# Patient Record
Sex: FEMALE | Race: OTHER | Employment: UNEMPLOYED | URBAN - METROPOLITAN AREA
[De-identification: names, ages, dates, MRNs, and addresses within clinical notes are randomized per-mention and may not be internally consistent; named-entity substitution may affect disease eponyms.]

---

## 2022-10-03 ENCOUNTER — OFFICE VISIT (OUTPATIENT)
Dept: INTERNAL MEDICINE CLINIC | Age: 22
End: 2022-10-03
Payer: MEDICAID

## 2022-10-03 VITALS
SYSTOLIC BLOOD PRESSURE: 109 MMHG | HEIGHT: 60 IN | RESPIRATION RATE: 18 BRPM | OXYGEN SATURATION: 97 % | DIASTOLIC BLOOD PRESSURE: 77 MMHG | BODY MASS INDEX: 29.45 KG/M2 | WEIGHT: 150 LBS | HEART RATE: 72 BPM

## 2022-10-03 DIAGNOSIS — Z00.00 WELL WOMAN EXAM (NO GYNECOLOGICAL EXAM): Primary | ICD-10-CM

## 2022-10-03 DIAGNOSIS — Z11.59 NEED FOR HEPATITIS C SCREENING TEST: ICD-10-CM

## 2022-10-03 DIAGNOSIS — F90.9 ATTENTION DEFICIT HYPERACTIVITY DISORDER (ADHD), UNSPECIFIED ADHD TYPE: ICD-10-CM

## 2022-10-03 PROCEDURE — 99385 PREV VISIT NEW AGE 18-39: CPT | Performed by: FAMILY MEDICINE

## 2022-10-03 NOTE — PATIENT INSTRUCTIONS
Learning About Attention Deficit Hyperactivity Disorder (ADHD) in Adults  What is ADHD? Attention deficit hyperactivity disorder (ADHD) is a condition in which people have a hard time paying attention. Adults with ADHD also may be more active than normal. They tend to act without thinking. ADHD may make it harder for them to focus, get organized, and finish tasks. ADHD most often starts in childhood and lasts into adulthood. Many adults don't know that they have ADHD until their children are diagnosed. Then they begin to see their own symptoms. Doctors don't know what causes ADHD. But it tends to run in families. What are the symptoms? The most common types of ADHD symptoms in adults are attention problems and hyperactivity. Attention problems  Adults with ADHD often find it hard to:  Finish tasks that don't interest them or aren't easy. But they may become obsessed with activities that they find interesting and enjoy. Keep relationships. Focus their attention on conversations, reading materials, or jobs. They may change jobs a lot. Remember things. They may misplace or lose things. Pay attention. They are easily distracted. They find it hard to focus on one task. Think before they act. They may make quick decisions. They may act before they think about the effect of their actions. Hyperactivity  Adults with ADHD may:  Fidget. They may swing their legs, shift in their seats, or tap their fingers. Move around a lot. They may feel \"revved up\" or on the go. They may not be able to slow down until they are very tired. Find it hard to relax. They may feel restless and find it hard to do quiet things like read or watch TV. How does ADHD affect daily life? ADHD in adults may affect:  Job performance. They may find it hard to organize their work, manage their time, and focus on one task at a time. They may forget, misplace, or lose things. They may quit their jobs out of boredom. Relationships. Adults with ADHD may find it hard to focus their attention on conversations. It is hard for them to \"read\" the behavior and moods of others and express their own feelings. Temper. They may get easily frustrated. This often can make it harder for them to deal with stress. These adults may overreact and have a short, quick temper. The ability to solve problems. Adults who have a hard time waiting for things they want may act before they think about the effect of their actions. They may take part in risky behaviors. These include unprotected sex, unsafe driving, alcohol and drug use, or unwise business ventures. How is ADHD treated? ADHD can be treated with medicines, behavior training, or counseling. Or it may be a combination of these treatments. Medicines  Stimulant medicines are most often used to treat ADHD. These may include:    Amphetamines. (Examples are Adderall and Dexedrine). Methylphenidate. (Examples are Concerta, Daytrana, Focalin, Metadate, and Ritalin). Other medicines that may be used are:    Atomoxetine. This includes Strattera, a nonstimulant medicine for ADHD. Antihypertensives. These include clonidine (such as Catapres) and guanfacine (such as Tenex). Antidepressants. These include bupropion (Wellbutrin). Behavior training  Behavior training can help adults with ADHD learn how to:    Get organized. A daily organizer or planner can help these adults organize their daily tasks. They can write down appointments and other things they need to remember. Decrease distractions. They can set up their work or home environment so that there are fewer things that will distract them. They may find using headphones or a \"white noise\" machine helpful. College students can arrange a quiet living situation. They may need a single dorm room. Work on relationships. Social skills training can help adults with ADHD relate to family, friends, and coworkers.  Couples counseling or family therapy can also help improve relationships. Counseling  Counseling is not meant to treat inattention, hyperactivity, or impulsiveness. But it can help with some of the problems that go along with ADHD. These include not getting along well with others and having problems following rules. Where can you learn more? Go to http://www.gray.com/  Enter Y948 in the search box to learn more about \"Learning About Attention Deficit Hyperactivity Disorder (ADHD) in Adults. \"  Current as of: June 16, 2021               Content Version: 13.2  © 2006-2022 [employer]. Care instructions adapted under license by [employer] (which disclaims liability or warranty for this information). If you have questions about a medical condition or this instruction, always ask your healthcare professional. [address] any warranty or liability for your use of this information.

## 2022-10-03 NOTE — PROGRESS NOTES
No chief complaint on file. she is a 25y.o. year old female who presents for CPE  Complete Physical Exam Questions:    LMP -  10/2/2022  Last Pap (q 3 years, or q5 with HPV) - unknown  Last Mammogram (50-74 biennially)- n/a  Hx of abnl Pap - No    1. Do you follow a low fat diet?  no  2. Are you up to date on your Tdap (<10 years)? Yes  3. Have you ever had a Pneumovax vaccine (>65)? Not applicable   XLS27 Not applicable   OHTT98 Not applicable  4. Have you had Zoster vaccine (>60)? Not applicable  5. Have you had the HPV - Gardasil (13- 26)? Unknown  6. Do you follow an exercise program?  yes  7. Do you smoke?  no  8. Do you consider yourself overweight?  no  9. Is there a family history of CAD< age 48? No  10. Is there a family history of Cancer?  no  11. Do you know your Cancer risks? No  12. Have you had a colonoscopy?  no  13. Have you been tested for HIV or other STI's? No HIV testing today(18-66 y/o)? No  14. Have had a bone density scan or DEXA done(>65)? No  15. Have you had an EKG performed in the last five years (>50)? No    Other complaints:    Reviewed and agree with Nurse Note and duplicated in this note. Reviewed PmHx, RxHx, FmHx, SocHx, AllgHx and updated and dated in the chart. No family history on file.     Past Medical History:   Diagnosis Date    Endocrine disease     early puberty      Social History     Socioeconomic History    Marital status: SINGLE        Review of Systems - negative except as listed above      Objective:     Vitals:    10/03/22 1035   BP: 109/77   Pulse: 72   Resp: 18   SpO2: 97%   Weight: 150 lb (68 kg)   Height: 5' (1.524 m)       Physical Examination: General appearance - alert, well appearing, and in no distress  Eyes - pupils equal and reactive, extraocular eye movements intact  Ears - bilateral TM's and external ear canals normal  Nose - normal and patent, no erythema, discharge or polyps  Mouth - mucous membranes moist, pharynx normal without lesions  Neck - supple, no significant adenopathy  Chest - clear to auscultation, no wheezes, rales or rhonchi, symmetric air entry  Heart - normal rate, regular rhythm, normal S1, S2, no murmurs, rubs, clicks or gallops  Abdomen - soft, nontender, nondistended, no masses or organomegaly  Neurological - alert, oriented, normal speech, no focal findings or movement disorder noted  Musculoskeletal - no joint tenderness, deformity or swelling  Extremities - peripheral pulses normal, no pedal edema, no clubbing or cyanosis  Skin - normal coloration and turgor, no rashes, no suspicious skin lesions noted      Assessment/ Plan:   Diagnoses and all orders for this visit:    1. Well woman exam (no gynecological exam)  -     CBC W/O DIFF; Future  -     LIPID PANEL; Future  -     METABOLIC PANEL, COMPREHENSIVE; Future    2. Need for hepatitis C screening test  -     HEPATITIS C AB; Future    3. Attention deficit hyperactivity disorder (ADHD), unspecified ADHD type  -     10-DRUG SCREEN, URINE W RFLX CONFIRMATION; Future       Patient is currently not in any need of refills, follow-up in 2 months virtually. In meantime she will get her neuropsych profile scanned into system.  is reviewed and urine drug screen will be done today    Labs to be drawn: CBC, CMP, Lipid            I have discussed the diagnosis with the patient and the intended plan as seen in the above orders. The patient has received an after-visit summary and questions were answered concerning future plans. Medication Side Effects and Warnings were discussed with patient,  Patient Labs were reviewed and or requested, and  Patient Past Records were reviewed and or requested  yes         Pt agrees to call or return to clinic and/or go to closest ER with any worsening of symptoms. This may include, but not limited to increased fever (>100.4) with NSAIDS or Tylenol, increased edema, confusion, rash, worsening of presenting symptoms.   Please note that this dictation was completed with [employer], the [employer] voice recognition software. Quite often unanticipated grammatical, syntax, homophones, and other interpretive errors are inadvertently transcribed by the computer software. Please disregard these errors. Please excuse any errors that have escaped final proofreading. Thank you.

## 2022-10-04 LAB
ALBUMIN SERPL-MCNC: 4.6 G/DL (ref 3.9–5)
ALBUMIN/GLOB SERPL: 2.2 {RATIO} (ref 1.2–2.2)
ALP SERPL-CCNC: 61 IU/L (ref 44–121)
ALT SERPL-CCNC: 12 IU/L (ref 0–32)
AST SERPL-CCNC: 10 IU/L (ref 0–40)
BILIRUB SERPL-MCNC: 0.3 MG/DL (ref 0–1.2)
BUN SERPL-MCNC: 10 MG/DL (ref 6–20)
BUN/CREAT SERPL: 14 (ref 9–23)
CALCIUM SERPL-MCNC: 9.6 MG/DL (ref 8.7–10.2)
CHLORIDE SERPL-SCNC: 106 MMOL/L (ref 96–106)
CHOLEST SERPL-MCNC: 163 MG/DL (ref 100–199)
CO2 SERPL-SCNC: 21 MMOL/L (ref 20–29)
CREAT SERPL-MCNC: 0.69 MG/DL (ref 0.57–1)
EGFR: 126 ML/MIN/1.73
ERYTHROCYTE [DISTWIDTH] IN BLOOD BY AUTOMATED COUNT: 13.1 % (ref 11.7–15.4)
GLOBULIN SER CALC-MCNC: 2.1 G/DL (ref 1.5–4.5)
GLUCOSE SERPL-MCNC: 106 MG/DL (ref 70–99)
HCT VFR BLD AUTO: 41.6 % (ref 34–46.6)
HCV AB S/CO SERPL IA: <0.1 S/CO RATIO (ref 0–0.9)
HDLC SERPL-MCNC: 34 MG/DL
HGB BLD-MCNC: 13.6 G/DL (ref 11.1–15.9)
LDLC SERPL CALC-MCNC: 102 MG/DL (ref 0–99)
MCH RBC QN AUTO: 29.4 PG (ref 26.6–33)
MCHC RBC AUTO-ENTMCNC: 32.7 G/DL (ref 31.5–35.7)
MCV RBC AUTO: 90 FL (ref 79–97)
PLATELET # BLD AUTO: 233 X10E3/UL (ref 150–450)
POTASSIUM SERPL-SCNC: 4.3 MMOL/L (ref 3.5–5.2)
PROT SERPL-MCNC: 6.7 G/DL (ref 6–8.5)
RBC # BLD AUTO: 4.62 X10E6/UL (ref 3.77–5.28)
SODIUM SERPL-SCNC: 140 MMOL/L (ref 134–144)
TRIGL SERPL-MCNC: 155 MG/DL (ref 0–149)
VLDLC SERPL CALC-MCNC: 27 MG/DL (ref 5–40)
WBC # BLD AUTO: 5.3 X10E3/UL (ref 3.4–10.8)

## 2022-10-04 NOTE — PROGRESS NOTES
Sugars are slightly elevated as is your cholesterol. Recommend daily exercise 30 minutes a day.   Lets repeat in 6 months

## 2022-10-07 LAB
ALPRAZ UR QL: NEGATIVE
AMPHET UR CFM-MCNC: 2558 NG/ML
AMPHET UR QL CFM: POSITIVE
AMPHETAMINES UR QL SCN: NORMAL NG/ML
AMPHETAMINES UR QL: POSITIVE
BARBITURATES UR QL SCN: NEGATIVE NG/ML
BENZODIAZ UR QL: NEGATIVE NG/ML
BZE UR QL SCN: NEGATIVE NG/ML
CANNABINOIDS UR QL SCN: NEGATIVE NG/ML
CLONAZEPAM UR QL: NEGATIVE
CREAT UR-MCNC: 184.4 MG/DL (ref 20–300)
FLURAZEPAM UR QL: NEGATIVE
LORAZEPAM UR QL: NEGATIVE
METHADONE UR QL SCN: NEGATIVE NG/ML
METHAMPHET UR QL CFM: NEGATIVE
MIDAZOLAM UR QL CFM: NEGATIVE
NORDIAZEPAM UR QL: NEGATIVE
OPIATES UR QL SCN: NEGATIVE NG/ML
OXAZEPAM UR QL: NEGATIVE
OXYCODONE+OXYMORPHONE UR QL SCN: NEGATIVE NG/ML
PCP UR QL: NEGATIVE NG/ML
PH UR: 5.9 [PH] (ref 4.5–8.9)
PLEASE NOTE:, 733157: NORMAL
PROPOXYPH UR QL SCN: NEGATIVE NG/ML
SP GR UR: 1.02
TEMAZEPAM UR QL CFM: NEGATIVE
TRIAZOLAM UR QL: NEGATIVE

## 2023-01-03 ENCOUNTER — OFFICE VISIT (OUTPATIENT)
Dept: INTERNAL MEDICINE CLINIC | Age: 23
End: 2023-01-03
Payer: MEDICAID

## 2023-01-03 VITALS
BODY MASS INDEX: 30.41 KG/M2 | DIASTOLIC BLOOD PRESSURE: 60 MMHG | WEIGHT: 154.9 LBS | TEMPERATURE: 97.9 F | HEART RATE: 62 BPM | HEIGHT: 60 IN | SYSTOLIC BLOOD PRESSURE: 105 MMHG | RESPIRATION RATE: 20 BRPM | OXYGEN SATURATION: 95 %

## 2023-01-03 DIAGNOSIS — F90.9 ATTENTION DEFICIT HYPERACTIVITY DISORDER (ADHD), UNSPECIFIED ADHD TYPE: Primary | ICD-10-CM

## 2023-01-03 DIAGNOSIS — R73.9 ELEVATED BLOOD SUGAR: ICD-10-CM

## 2023-01-03 PROCEDURE — 99214 OFFICE O/P EST MOD 30 MIN: CPT | Performed by: FAMILY MEDICINE

## 2023-01-03 RX ORDER — DEXTROAMPHETAMINE SACCHARATE, AMPHETAMINE ASPARTATE MONOHYDRATE, DEXTROAMPHETAMINE SULFATE AND AMPHETAMINE SULFATE 2.5; 2.5; 2.5; 2.5 MG/1; MG/1; MG/1; MG/1
10 CAPSULE, EXTENDED RELEASE ORAL DAILY
Qty: 30 CAPSULE | Refills: 0 | Status: SHIPPED | OUTPATIENT
Start: 2023-01-03 | End: 2023-02-01

## 2023-01-03 RX ORDER — DEXTROAMPHETAMINE SACCHARATE, AMPHETAMINE ASPARTATE MONOHYDRATE, DEXTROAMPHETAMINE SULFATE AND AMPHETAMINE SULFATE 2.5; 2.5; 2.5; 2.5 MG/1; MG/1; MG/1; MG/1
10 CAPSULE, EXTENDED RELEASE ORAL DAILY
Qty: 30 CAPSULE | Refills: 0 | Status: SHIPPED | OUTPATIENT
Start: 2023-03-04 | End: 2023-04-02

## 2023-01-03 RX ORDER — DEXTROAMPHETAMINE SACCHARATE, AMPHETAMINE ASPARTATE MONOHYDRATE, DEXTROAMPHETAMINE SULFATE AND AMPHETAMINE SULFATE 2.5; 2.5; 2.5; 2.5 MG/1; MG/1; MG/1; MG/1
10 CAPSULE, EXTENDED RELEASE ORAL DAILY
Qty: 30 CAPSULE | Refills: 0 | Status: SHIPPED | OUTPATIENT
Start: 2023-02-02 | End: 2023-03-03

## 2023-01-03 NOTE — PROGRESS NOTES
Chief Complaint   Patient presents with    Follow-up     1. Have you been to the ER, urgent care clinic since your last visit? Hospitalized since your last visit? No    2. Have you seen or consulted any other health care providers outside of the [address] [place] [address] since your last visit? Include any pap smears or colon screening. No  Chief Complaint   Patient presents with    Follow-up     she is a 25y.o. year old female who presents for follow-up of ADD/ADHD   Mental Health Review  Patient is seen for ADHD. Current treatment regimen includes: adderall  Medication compliance: Yes. Pt reports the following side effects: none    Reviewed and agree with Nurse Note and duplicated in this note. Reviewed PmHx, RxHx, FmHx, SocHx, AllgHx and updated and dated in the chart. History reviewed. No pertinent family history.     Past Medical History:   Diagnosis Date    Endocrine disease     early puberty      Social History     Socioeconomic History    Marital status: SINGLE   Tobacco Use    Smoking status: Never   Substance and Sexual Activity    Alcohol use: Never    Drug use: Never        Review of Systems - negative except as listed above      Objective:     Vitals:    01/03/23 0848   Weight: 154 lb 14.4 oz (70.3 kg)   Height: 5' (1.524 m)       Physical Examination: General appearance - alert, well appearing, and in no distress  Eyes - pupils equal and reactive, extraocular eye movements intact  Ears - bilateral TM's and external ear canals normal  Nose - normal and patent, no erythema, discharge or polyps  Mouth - mucous membranes moist, pharynx normal without lesions  Neck - supple, no significant adenopathy  Chest - clear to auscultation, no wheezes, rales or rhonchi, symmetric air entry  Heart - normal rate, regular rhythm, normal S1, S2, no murmurs, rubs, clicks or gallops  Abdomen - soft, nontender, nondistended, no masses or organomegaly  Neurological - alert, oriented, normal speech, no focal findings or movement disorder noted  Musculoskeletal - no joint tenderness, deformity or swelling  Extremities - peripheral pulses normal, no pedal edema, no clubbing or cyanosis  Skin - normal coloration and turgor, no rashes, no suspicious skin lesions noted    Assessment/ Plan:   Diagnoses and all orders for this visit:    1. Attention deficit hyperactivity disorder (ADHD), unspecified ADHD type  -     amphetamine-dextroamphetamine XR (ADDERALL XR) 10 mg XR capsule; Take 1 Capsule by mouth daily for 29 days. Max Daily Amount: 10 mg.  -     amphetamine-dextroamphetamine XR (ADDERALL XR) 10 mg XR capsule; Take 1 Capsule by mouth daily for 29 days. Max Daily Amount: 10 mg.  -     amphetamine-dextroamphetamine XR (ADDERALL XR) 10 mg XR capsule; Take 1 Capsule by mouth daily for 29 days. Max Daily Amount: 10 mg.    2. Elevated blood sugar  -     HEMOGLOBIN A1C WITH EAG; Future         Stable on current medications, refilled for the next 3 months and follow-up in 3 months      I have discussed the diagnosis with the patient and the intended plan as seen in the above orders. The patient has received an after-visit summary and questions were answered concerning future plans. Medication Side Effects and Warnings were discussed with patient,  Patient Labs were reviewed and or requested, and  Patient Past Records were reviewed and or requested  yes         Pt agrees to call or return to clinic and/or go to closest ER with any worsening of symptoms. This may include, but not limited to increased fever (>100.4) with NSAIDS or Tylenol, increased edema, confusion, rash, worsening of presenting symptoms. Please note that this dictation was completed with [employer], the computer voice recognition software. Quite often unanticipated grammatical, syntax, homophones, and other interpretive errors are inadvertently transcribed by the computer software. Please disregard these errors.   Please excuse any errors that have escaped final proofreading. Thank you.

## 2023-01-04 LAB
EST. AVERAGE GLUCOSE BLD GHB EST-MCNC: 108 MG/DL
HBA1C MFR BLD: 5.4 % (ref 4.8–5.6)

## 2023-02-28 DIAGNOSIS — F90.9 ATTENTION DEFICIT HYPERACTIVITY DISORDER (ADHD), UNSPECIFIED ADHD TYPE: ICD-10-CM

## 2023-02-28 RX ORDER — DEXTROAMPHETAMINE SACCHARATE, AMPHETAMINE ASPARTATE MONOHYDRATE, DEXTROAMPHETAMINE SULFATE AND AMPHETAMINE SULFATE 2.5; 2.5; 2.5; 2.5 MG/1; MG/1; MG/1; MG/1
10 CAPSULE, EXTENDED RELEASE ORAL DAILY
Qty: 30 CAPSULE | Refills: 0 | Status: SHIPPED | OUTPATIENT
Start: 2023-02-28 | End: 2023-03-29

## 2023-04-19 ENCOUNTER — OFFICE VISIT (OUTPATIENT)
Dept: INTERNAL MEDICINE CLINIC | Age: 23
End: 2023-04-19
Payer: MEDICAID

## 2023-04-19 VITALS
HEIGHT: 60 IN | RESPIRATION RATE: 18 BRPM | DIASTOLIC BLOOD PRESSURE: 62 MMHG | BODY MASS INDEX: 30.29 KG/M2 | SYSTOLIC BLOOD PRESSURE: 100 MMHG | TEMPERATURE: 97.7 F | HEART RATE: 82 BPM | WEIGHT: 154.3 LBS | OXYGEN SATURATION: 98 %

## 2023-04-19 DIAGNOSIS — F90.9 ATTENTION DEFICIT HYPERACTIVITY DISORDER (ADHD), UNSPECIFIED ADHD TYPE: Primary | ICD-10-CM

## 2023-04-19 PROCEDURE — 99214 OFFICE O/P EST MOD 30 MIN: CPT | Performed by: FAMILY MEDICINE

## 2023-04-19 RX ORDER — DEXTROAMPHETAMINE SACCHARATE, AMPHETAMINE ASPARTATE MONOHYDRATE, DEXTROAMPHETAMINE SULFATE AND AMPHETAMINE SULFATE 2.5; 2.5; 2.5; 2.5 MG/1; MG/1; MG/1; MG/1
10 CAPSULE, EXTENDED RELEASE ORAL
COMMUNITY
End: 2023-04-19 | Stop reason: SDUPTHER

## 2023-04-19 RX ORDER — DEXTROAMPHETAMINE SACCHARATE, AMPHETAMINE ASPARTATE MONOHYDRATE, DEXTROAMPHETAMINE SULFATE AND AMPHETAMINE SULFATE 2.5; 2.5; 2.5; 2.5 MG/1; MG/1; MG/1; MG/1
10 CAPSULE, EXTENDED RELEASE ORAL
Qty: 30 CAPSULE | Refills: 0 | Status: SHIPPED | OUTPATIENT
Start: 2023-05-19

## 2023-04-19 RX ORDER — DEXTROAMPHETAMINE SACCHARATE, AMPHETAMINE ASPARTATE MONOHYDRATE, DEXTROAMPHETAMINE SULFATE AND AMPHETAMINE SULFATE 2.5; 2.5; 2.5; 2.5 MG/1; MG/1; MG/1; MG/1
10 CAPSULE, EXTENDED RELEASE ORAL
Qty: 30 CAPSULE | Refills: 0 | Status: SHIPPED | OUTPATIENT
Start: 2023-04-19

## 2023-04-19 RX ORDER — DEXTROAMPHETAMINE SACCHARATE, AMPHETAMINE ASPARTATE MONOHYDRATE, DEXTROAMPHETAMINE SULFATE AND AMPHETAMINE SULFATE 2.5; 2.5; 2.5; 2.5 MG/1; MG/1; MG/1; MG/1
10 CAPSULE, EXTENDED RELEASE ORAL
Qty: 30 CAPSULE | Refills: 0 | Status: SHIPPED | OUTPATIENT
Start: 2023-06-18

## 2023-04-19 NOTE — PATIENT INSTRUCTIONS
Learning About Attention Deficit Hyperactivity Disorder (ADHD) in Adults  What is ADHD? Attention deficit hyperactivity disorder (ADHD) is a condition in which people have a hard time paying attention. Adults with ADHD also may be more active than normal. They tend to act without thinking. ADHD may make it harder for them to focus, get organized, and finish tasks. ADHD most often starts in childhood and lasts into adulthood. Many adults don't know that they have ADHD until their children are diagnosed. Then they begin to see their own symptoms. Doctors don't know what causes ADHD. But it tends to run in families. What are the symptoms? The most common types of ADHD symptoms in adults are attention problems and hyperactivity. Attention problems  Adults with ADHD often find it hard to:  Finish tasks that don't interest them or aren't easy. But they may become obsessed with activities that they find interesting and enjoy. Keep relationships. Focus their attention on conversations, reading materials, or jobs. They may change jobs a lot. Remember things. They may misplace or lose things. Pay attention. They are easily distracted. They find it hard to focus on one task. Think before they act. They may make quick decisions. They may act before they think about the effect of their actions. Hyperactivity  Adults with ADHD may:  Fidget. They may swing their legs, shift in their seats, or tap their fingers. Move around a lot. They may feel \"revved up\" or on the go. They may not be able to slow down until they are very tired. Find it hard to relax. They may feel restless and find it hard to do quiet things like read or watch TV. How does ADHD affect daily life? ADHD in adults may affect:  Job performance. They may find it hard to organize their work, manage their time, and focus on one task at a time. They may forget, misplace, or lose things. They may quit their jobs out of boredom. Relationships. Adults with ADHD may find it hard to focus their attention on conversations. It is hard for them to \"read\" the behavior and moods of others and express their own feelings. Temper. They may get easily frustrated. This often can make it harder for them to deal with stress. These adults may overreact and have a short, quick temper. The ability to solve problems. Adults who have a hard time waiting for things they want may act before they think about the effect of their actions. They may take part in risky behaviors. These include unprotected sex, unsafe driving, alcohol and drug use, or unwise business ventures. How is ADHD treated? ADHD can be treated with medicines, behavior training, or counseling. Or it may be a combination of these treatments. Medicines  Stimulant medicines are most often used to treat ADHD. These may include:    Amphetamines. (Examples are Adderall and Dexedrine). Methylphenidate. (Examples are Concerta, Daytrana, Focalin, Metadate, and Ritalin). Other medicines that may be used are:    Atomoxetine. This includes Strattera, a nonstimulant medicine for ADHD. Antihypertensives. These include clonidine (such as Catapres) and guanfacine (such as Tenex). Antidepressants. These include bupropion (Wellbutrin). Behavior training  Behavior training can help adults with ADHD learn how to:    Get organized. A daily organizer or planner can help these adults organize their daily tasks. They can write down appointments and other things they need to remember. Decrease distractions. They can set up their work or home environment so that there are fewer things that will distract them. They may find using headphones or a \"white noise\" machine helpful. College students can arrange a quiet living situation. They may need a single dorm room. Work on relationships. Social skills training can help adults with ADHD relate to family, friends, and coworkers.  Couples counseling or family therapy can also help improve relationships. Counseling  Counseling is not meant to treat inattention, hyperactivity, or impulsiveness. But it can help with some of the problems that go along with ADHD. These include not getting along well with others and having problems following rules. Where can you learn more? Go to http://www.gray.com/  Enter P620 in the search box to learn more about \"Learning About Attention Deficit Hyperactivity Disorder (ADHD) in Adults. \"  Current as of: October 20, 2022               Content Version: 13.6  © 2006-2023 [employer]. Care instructions adapted under license by [employer] (which disclaims liability or warranty for this information). If you have questions about a medical condition or this instruction, always ask your healthcare professional. [address] any warranty or liability for your use of this information.

## 2023-05-24 RX ORDER — DEXTROAMPHETAMINE SACCHARATE, AMPHETAMINE ASPARTATE MONOHYDRATE, DEXTROAMPHETAMINE SULFATE AND AMPHETAMINE SULFATE 2.5; 2.5; 2.5; 2.5 MG/1; MG/1; MG/1; MG/1
10 CAPSULE, EXTENDED RELEASE ORAL
COMMUNITY
Start: 2023-04-19 | End: 2023-05-31 | Stop reason: SDUPTHER

## 2023-05-30 ENCOUNTER — TELEPHONE (OUTPATIENT)
Facility: CLINIC | Age: 23
End: 2023-05-30

## 2023-05-31 DIAGNOSIS — F90.2 ATTENTION DEFICIT HYPERACTIVITY DISORDER (ADHD), COMBINED TYPE: Primary | ICD-10-CM

## 2023-05-31 RX ORDER — DEXTROAMPHETAMINE SACCHARATE, AMPHETAMINE ASPARTATE MONOHYDRATE, DEXTROAMPHETAMINE SULFATE AND AMPHETAMINE SULFATE 2.5; 2.5; 2.5; 2.5 MG/1; MG/1; MG/1; MG/1
10 CAPSULE, EXTENDED RELEASE ORAL EVERY MORNING
Qty: 30 CAPSULE | Refills: 0 | Status: SHIPPED | OUTPATIENT
Start: 2023-05-31 | End: 2023-06-30

## 2023-08-03 ENCOUNTER — TELEMEDICINE (OUTPATIENT)
Facility: CLINIC | Age: 23
End: 2023-08-03
Payer: COMMERCIAL

## 2023-08-03 DIAGNOSIS — F90.9 ATTENTION DEFICIT HYPERACTIVITY DISORDER (ADHD), UNSPECIFIED ADHD TYPE: Primary | ICD-10-CM

## 2023-08-03 PROCEDURE — 99214 OFFICE O/P EST MOD 30 MIN: CPT | Performed by: FAMILY MEDICINE

## 2023-08-03 RX ORDER — DEXTROAMPHETAMINE SACCHARATE, AMPHETAMINE ASPARTATE MONOHYDRATE, DEXTROAMPHETAMINE SULFATE AND AMPHETAMINE SULFATE 2.5; 2.5; 2.5; 2.5 MG/1; MG/1; MG/1; MG/1
10 CAPSULE, EXTENDED RELEASE ORAL DAILY
Qty: 30 CAPSULE | Refills: 0 | Status: SHIPPED | OUTPATIENT
Start: 2023-10-30 | End: 2023-11-29

## 2023-08-03 RX ORDER — DEXTROAMPHETAMINE SACCHARATE, AMPHETAMINE ASPARTATE MONOHYDRATE, DEXTROAMPHETAMINE SULFATE AND AMPHETAMINE SULFATE 2.5; 2.5; 2.5; 2.5 MG/1; MG/1; MG/1; MG/1
10 CAPSULE, EXTENDED RELEASE ORAL DAILY
Qty: 30 CAPSULE | Refills: 0 | Status: SHIPPED | OUTPATIENT
Start: 2023-08-30 | End: 2023-09-29

## 2023-08-03 RX ORDER — DEXTROAMPHETAMINE SACCHARATE, AMPHETAMINE ASPARTATE MONOHYDRATE, DEXTROAMPHETAMINE SULFATE AND AMPHETAMINE SULFATE 2.5; 2.5; 2.5; 2.5 MG/1; MG/1; MG/1; MG/1
10 CAPSULE, EXTENDED RELEASE ORAL DAILY
Qty: 30 CAPSULE | Refills: 0 | Status: SHIPPED | OUTPATIENT
Start: 2023-09-30 | End: 2023-10-30

## 2023-08-03 ASSESSMENT — PATIENT HEALTH QUESTIONNAIRE - PHQ9
SUM OF ALL RESPONSES TO PHQ QUESTIONS 1-9: 0
SUM OF ALL RESPONSES TO PHQ QUESTIONS 1-9: 0
SUM OF ALL RESPONSES TO PHQ9 QUESTIONS 1 & 2: 0
2. FEELING DOWN, DEPRESSED OR HOPELESS: 0
SUM OF ALL RESPONSES TO PHQ QUESTIONS 1-9: 0
1. LITTLE INTEREST OR PLEASURE IN DOING THINGS: 0
SUM OF ALL RESPONSES TO PHQ QUESTIONS 1-9: 0

## 2023-08-03 NOTE — PROGRESS NOTES
Sarah Bearden (:  2000) is a Established patient, presenting virtually for evaluation of the following:    Assessment & Plan   Below is the assessment and plan developed based on review of pertinent history, physical exam, labs, studies, and medications. 1. Attention deficit hyperactivity disorder (ADHD), unspecified ADHD type  -     amphetamine-dextroamphetamine (ADDERALL XR) 10 MG extended release capsule; Take 1 capsule by mouth daily for 30 days. Max Daily Amount: 10 mg, Disp-30 capsule, R-0Normal  -     amphetamine-dextroamphetamine (ADDERALL XR) 10 MG extended release capsule; Take 1 capsule by mouth daily for 30 days. Max Daily Amount: 10 mg, Disp-30 capsule, R-0Normal  -     amphetamine-dextroamphetamine (ADDERALL XR) 10 MG extended release capsule; Take 1 capsule by mouth daily for 30 days. Max Daily Amount: 10 mg, Disp-30 capsule, R-0Normal    Return in 3 months (on 11/3/2023) for ADHD. Subjective   Patient is a 24-year-old female who is following up on ADHD. Patient states that she is taking her medications compliantly and has had no side effects. Denies any chest pain palpitations or insomnia. Patient has had trouble getting a prior Auth done for her Adderall and would like to have this looked into. The Adderall is treating her symptoms well. Review of Systems   All other systems reviewed and are negative.        Objective   Patient-Reported Vitals  No data recorded     Physical Exam  [INSTRUCTIONS:  \"[x]\" Indicates a positive item  \"[]\" Indicates a negative item  -- DELETE ALL ITEMS NOT EXAMINED]    Constitutional: [x] Appears well-developed and well-nourished [x] No apparent distress      [] Abnormal -     Mental status: [x] Alert and awake  [x] Oriented to person/place/time [x] Able to follow commands    [] Abnormal -     Eyes:   EOM    [x]  Normal    [] Abnormal -   Sclera  [x]  Normal    [] Abnormal -          Discharge [x]  None visible   [] Abnormal -     HENT: [x]

## 2023-11-02 ENCOUNTER — TELEMEDICINE (OUTPATIENT)
Facility: CLINIC | Age: 23
End: 2023-11-02
Payer: COMMERCIAL

## 2023-11-02 DIAGNOSIS — F90.9 ATTENTION DEFICIT HYPERACTIVITY DISORDER (ADHD), UNSPECIFIED ADHD TYPE: Primary | ICD-10-CM

## 2023-11-02 PROCEDURE — 99214 OFFICE O/P EST MOD 30 MIN: CPT | Performed by: FAMILY MEDICINE

## 2023-11-02 RX ORDER — DEXTROAMPHETAMINE SACCHARATE, AMPHETAMINE ASPARTATE MONOHYDRATE, DEXTROAMPHETAMINE SULFATE AND AMPHETAMINE SULFATE 2.5; 2.5; 2.5; 2.5 MG/1; MG/1; MG/1; MG/1
10 CAPSULE, EXTENDED RELEASE ORAL DAILY
Qty: 30 CAPSULE | Refills: 0 | Status: SHIPPED | OUTPATIENT
Start: 2024-01-01 | End: 2024-01-31

## 2023-11-02 RX ORDER — DEXTROAMPHETAMINE SACCHARATE, AMPHETAMINE ASPARTATE MONOHYDRATE, DEXTROAMPHETAMINE SULFATE AND AMPHETAMINE SULFATE 2.5; 2.5; 2.5; 2.5 MG/1; MG/1; MG/1; MG/1
10 CAPSULE, EXTENDED RELEASE ORAL DAILY
Qty: 30 CAPSULE | Refills: 0 | Status: SHIPPED | OUTPATIENT
Start: 2023-11-02 | End: 2023-12-02

## 2023-11-02 RX ORDER — DEXTROAMPHETAMINE SACCHARATE, AMPHETAMINE ASPARTATE MONOHYDRATE, DEXTROAMPHETAMINE SULFATE AND AMPHETAMINE SULFATE 2.5; 2.5; 2.5; 2.5 MG/1; MG/1; MG/1; MG/1
10 CAPSULE, EXTENDED RELEASE ORAL DAILY
Qty: 30 CAPSULE | Refills: 0 | Status: SHIPPED | OUTPATIENT
Start: 2023-12-02 | End: 2024-01-01

## 2023-11-02 NOTE — PROGRESS NOTES
Natasha Le, was evaluated through a synchronous (real-time) audio-video encounter. The patient (or guardian if applicable) is aware that this is a billable service, which includes applicable co-pays. This Virtual Visit was conducted with patient's (and/or legal guardian's) consent. Patient identification was verified, and a caregiver was present when appropriate. The patient was located at Home: [address] [place] [address]  Provider was located at Facility (Appt Dept): [facility] [address]      Natasha Le (:  2000) is a Established patient, presenting virtually for evaluation of the following:    Assessment & Plan   Below is the assessment and plan developed based on review of pertinent history, physical exam, labs, studies, and medications. 1. Attention deficit hyperactivity disorder (ADHD), unspecified ADHD type  -     amphetamine-dextroamphetamine (ADDERALL XR) 10 MG extended release capsule; Take 1 capsule by mouth daily for 30 days. Max Daily Amount: 10 mg, Disp-30 capsule, R-0Normal  -     amphetamine-dextroamphetamine (ADDERALL XR) 10 MG extended release capsule; Take 1 capsule by mouth daily for 30 days. Max Daily Amount: 10 mg, Disp-30 capsule, R-0Normal  -     amphetamine-dextroamphetamine (ADDERALL XR) 10 MG extended release capsule; Take 1 capsule by mouth daily for 30 days. Max Daily Amount: 10 mg, Disp-30 capsule, R-0Normal    Return in 3 months (on 2024) for [employer], ADHD. Subjective   Patient is a 58-year-old female following up on ADHD. She states that she is taking her 10 mg Adderall XR and has had no side effects such as chest pain palpitations or insomnia. Patient states that will last her all day and gets her through the workday which is all she needs. She is happy with current treatment      Review of Systems   All other systems reviewed and are negative.          Objective   Patient-Reported Vitals  No data recorded     Physical

## 2024-01-17 DIAGNOSIS — F90.2 ATTENTION DEFICIT HYPERACTIVITY DISORDER (ADHD), COMBINED TYPE: ICD-10-CM

## 2024-01-17 RX ORDER — DEXTROAMPHETAMINE SACCHARATE, AMPHETAMINE ASPARTATE MONOHYDRATE, DEXTROAMPHETAMINE SULFATE AND AMPHETAMINE SULFATE 2.5; 2.5; 2.5; 2.5 MG/1; MG/1; MG/1; MG/1
10 CAPSULE, EXTENDED RELEASE ORAL EVERY MORNING
Qty: 30 CAPSULE | Refills: 0 | Status: SHIPPED | OUTPATIENT
Start: 2024-01-17 | End: 2024-02-16

## 2024-02-02 ENCOUNTER — OFFICE VISIT (OUTPATIENT)
Facility: CLINIC | Age: 24
End: 2024-02-02

## 2024-02-02 VITALS
SYSTOLIC BLOOD PRESSURE: 106 MMHG | TEMPERATURE: 98.3 F | BODY MASS INDEX: 30.43 KG/M2 | OXYGEN SATURATION: 97 % | HEART RATE: 80 BPM | RESPIRATION RATE: 16 BRPM | DIASTOLIC BLOOD PRESSURE: 70 MMHG | WEIGHT: 155 LBS | HEIGHT: 60 IN

## 2024-02-02 DIAGNOSIS — F90.2 ATTENTION DEFICIT HYPERACTIVITY DISORDER (ADHD), COMBINED TYPE: ICD-10-CM

## 2024-02-02 DIAGNOSIS — Z00.00 WELL WOMAN EXAM (NO GYNECOLOGICAL EXAM): Primary | ICD-10-CM

## 2024-02-02 RX ORDER — DEXTROAMPHETAMINE SACCHARATE, AMPHETAMINE ASPARTATE MONOHYDRATE, DEXTROAMPHETAMINE SULFATE AND AMPHETAMINE SULFATE 2.5; 2.5; 2.5; 2.5 MG/1; MG/1; MG/1; MG/1
10 CAPSULE, EXTENDED RELEASE ORAL DAILY
Qty: 30 CAPSULE | Refills: 0 | Status: SHIPPED | OUTPATIENT
Start: 2024-04-02 | End: 2024-05-02

## 2024-02-02 RX ORDER — DEXTROAMPHETAMINE SACCHARATE, AMPHETAMINE ASPARTATE MONOHYDRATE, DEXTROAMPHETAMINE SULFATE AND AMPHETAMINE SULFATE 2.5; 2.5; 2.5; 2.5 MG/1; MG/1; MG/1; MG/1
10 CAPSULE, EXTENDED RELEASE ORAL DAILY
Qty: 30 CAPSULE | Refills: 0 | Status: SHIPPED | OUTPATIENT
Start: 2024-02-02 | End: 2024-03-03

## 2024-02-02 RX ORDER — DEXTROAMPHETAMINE SACCHARATE, AMPHETAMINE ASPARTATE MONOHYDRATE, DEXTROAMPHETAMINE SULFATE AND AMPHETAMINE SULFATE 2.5; 2.5; 2.5; 2.5 MG/1; MG/1; MG/1; MG/1
10 CAPSULE, EXTENDED RELEASE ORAL DAILY
Qty: 30 CAPSULE | Refills: 0 | Status: SHIPPED | OUTPATIENT
Start: 2024-03-03 | End: 2024-04-02

## 2024-02-02 ASSESSMENT — PATIENT HEALTH QUESTIONNAIRE - PHQ9
SUM OF ALL RESPONSES TO PHQ QUESTIONS 1-9: 0
2. FEELING DOWN, DEPRESSED OR HOPELESS: 0
SUM OF ALL RESPONSES TO PHQ QUESTIONS 1-9: 0
1. LITTLE INTEREST OR PLEASURE IN DOING THINGS: 0
SUM OF ALL RESPONSES TO PHQ QUESTIONS 1-9: 0
SUM OF ALL RESPONSES TO PHQ QUESTIONS 1-9: 0
SUM OF ALL RESPONSES TO PHQ9 QUESTIONS 1 & 2: 0

## 2024-02-02 NOTE — PROGRESS NOTES
Chief Complaint   Patient presents with    Annual Exam     Patient is here for a wellness visit.      she is a 23 y.o. year old female who presents for CPE  Complete Physical Exam Questions:    LMP -  1/17/2023  Last Pap (q 3 years, or q5 with HPV) - n/a  Last Mammogram (50-74 biennially)- n/a  Hx of abnl Pap - Not applicable    1.  Do you follow a low fat diet?  no  2.  Are you up to date on your Tdap (<10 years)?  Yes  3.  Have you ever had a Pneumovax vaccine (>65)?  Not applicable   PCV13 Not applicable   PPSV23 Not applicable  4.  Have you had Zoster vaccine (>60)? Not applicable  5.  Have you had the HPV - Gardasil (13- 26)? No  6.  Do you follow an exercise program?  Yes  7.  Do you smoke?  No  8.  Do you consider yourself overweight?  No  9.  Is there a family history of CAD< age 50? No  10.  Is there a family history of Cancer?  No  11.  Do you know your Cancer risks?  Yes  12.  Have you had a colonoscopy?  No  13. Have you been tested for HIV or other STI's? No HIV testing today(18-66 y/o)?No  14.  Have had a bone density scan or DEXA done(>65)?No  15.  Have you had an EKG performed in the last five years (>50)?  No    Other complaints:ADHD     Reviewed and agree with Nurse Note and duplicated in this note.  Reviewed PmHx, RxHx, FmHx, SocHx, AllgHx and updated and dated in the chart.    No family history on file.    Past Medical History:   Diagnosis Date    Endocrine disease     early puberty      Social History     Socioeconomic History    Marital status: Single   Tobacco Use    Smoking status: Never   Substance and Sexual Activity    Alcohol use: Never    Drug use: Never     Social Determinants of Health     Financial Resource Strain: Low Risk  (4/19/2023)    Overall Financial Resource Strain (CARDIA)     Difficulty of Paying Living Expenses: Not very hard   Transportation Needs: No Transportation Needs (4/19/2023)    PRAPARE - Transportation     Lack of Transportation (Medical): No     Lack of

## 2024-02-02 NOTE — PATIENT INSTRUCTIONS
Patient Education        Learning About Attention Deficit Hyperactivity Disorder (ADHD) in Adults  What is ADHD?     Attention deficit hyperactivity disorder (ADHD) is a condition in which people have a hard time paying attention. Adults with ADHD also may be more active than normal. They tend to act without thinking. ADHD may make it harder for them to focus, get organized, and finish tasks.  ADHD most often starts in childhood and lasts into adulthood. Many adults don't know that they have ADHD until their children are diagnosed. Then they begin to see their own symptoms.  Doctors don't know what causes ADHD. But it tends to run in families.  What are the symptoms?  The most common types of ADHD symptoms in adults are attention problems and hyperactivity.  Attention problems  Adults with ADHD often find it hard to:  Finish tasks that don't interest them or aren't easy. But they may become obsessed with activities that they find interesting and enjoy.  Focus their attention on conversations, reading materials, or jobs. They may change jobs a lot.  Remember things. They may misplace or lose things.  Pay attention. They are easily distracted. They find it hard to focus on one task.  Organize tasks and activities. They may not be able to manage their time well. And they may have a hard time keeping their things in order.  Hyperactivity  Adults with ADHD may:  Fidget. They may swing their legs, shift in their seats, or tap their fingers.  Move around a lot. They may feel \"revved up\" or on the go. They may not be able to slow down until they are very tired.  Find it hard to relax. They may feel restless and find it hard to do quiet things like read or watch TV.  Talk a lot or interrupt others. They may blurt out an answer before a question has been completed.  Find it hard to wait their turn, such as when they're in line.  How does ADHD affect daily life?  ADHD in adults may affect:  Job performance. They may find it

## 2024-02-03 LAB
ALBUMIN SERPL-MCNC: 4.8 G/DL (ref 4–5)
ALBUMIN/GLOB SERPL: 2.3 {RATIO} (ref 1.2–2.2)
ALP SERPL-CCNC: 60 IU/L (ref 44–121)
ALT SERPL-CCNC: 17 IU/L (ref 0–32)
AST SERPL-CCNC: 14 IU/L (ref 0–40)
BASOPHILS # BLD AUTO: 0 X10E3/UL (ref 0–0.2)
BASOPHILS NFR BLD AUTO: 0 %
BILIRUB SERPL-MCNC: 0.6 MG/DL (ref 0–1.2)
BUN SERPL-MCNC: 13 MG/DL (ref 6–20)
BUN/CREAT SERPL: 19 (ref 9–23)
CALCIUM SERPL-MCNC: 9.8 MG/DL (ref 8.7–10.2)
CHLORIDE SERPL-SCNC: 105 MMOL/L (ref 96–106)
CHOLEST SERPL-MCNC: 199 MG/DL (ref 100–199)
CO2 SERPL-SCNC: 21 MMOL/L (ref 20–29)
CREAT SERPL-MCNC: 0.69 MG/DL (ref 0.57–1)
EGFRCR SERPLBLD CKD-EPI 2021: 125 ML/MIN/1.73
EOSINOPHIL # BLD AUTO: 0.1 X10E3/UL (ref 0–0.4)
EOSINOPHIL NFR BLD AUTO: 1 %
ERYTHROCYTE [DISTWIDTH] IN BLOOD BY AUTOMATED COUNT: 12.7 % (ref 11.7–15.4)
GLOBULIN SER CALC-MCNC: 2.1 G/DL (ref 1.5–4.5)
GLUCOSE SERPL-MCNC: 98 MG/DL (ref 70–99)
HBA1C MFR BLD: 5.3 % (ref 4.8–5.6)
HCT VFR BLD AUTO: 42.4 % (ref 34–46.6)
HDLC SERPL-MCNC: 43 MG/DL
HGB BLD-MCNC: 13.8 G/DL (ref 11.1–15.9)
HIV 1+2 AB+HIV1 P24 AG SERPL QL IA: NON REACTIVE
IMM GRANULOCYTES # BLD AUTO: 0 X10E3/UL (ref 0–0.1)
IMM GRANULOCYTES NFR BLD AUTO: 0 %
LDLC SERPL CALC-MCNC: 136 MG/DL (ref 0–99)
LYMPHOCYTES # BLD AUTO: 1.9 X10E3/UL (ref 0.7–3.1)
LYMPHOCYTES NFR BLD AUTO: 39 %
MCH RBC QN AUTO: 29.2 PG (ref 26.6–33)
MCHC RBC AUTO-ENTMCNC: 32.5 G/DL (ref 31.5–35.7)
MCV RBC AUTO: 90 FL (ref 79–97)
MONOCYTES # BLD AUTO: 0.2 X10E3/UL (ref 0.1–0.9)
MONOCYTES NFR BLD AUTO: 5 %
NEUTROPHILS # BLD AUTO: 2.7 X10E3/UL (ref 1.4–7)
NEUTROPHILS NFR BLD AUTO: 55 %
PLATELET # BLD AUTO: 242 X10E3/UL (ref 150–450)
POTASSIUM SERPL-SCNC: 4.4 MMOL/L (ref 3.5–5.2)
PROT SERPL-MCNC: 6.9 G/DL (ref 6–8.5)
RBC # BLD AUTO: 4.73 X10E6/UL (ref 3.77–5.28)
SODIUM SERPL-SCNC: 140 MMOL/L (ref 134–144)
TRIGL SERPL-MCNC: 113 MG/DL (ref 0–149)
VLDLC SERPL CALC-MCNC: 20 MG/DL (ref 5–40)
WBC # BLD AUTO: 5 X10E3/UL (ref 3.4–10.8)

## 2024-02-09 LAB
AMPHET UR CFM-MCNC: 1079 NG/ML
AMPHET UR QL CFM: POSITIVE
AMPHETAMINES UR QL SCN: NORMAL NG/ML
AMPHETAMINES UR QL: POSITIVE
BARBITURATES UR QL SCN: NEGATIVE NG/ML
BENZODIAZ UR QL SCN: NEGATIVE NG/ML
BZE UR QL SCN: NEGATIVE NG/ML
CANNABINOIDS UR QL SCN: NEGATIVE NG/ML
CREAT UR-MCNC: 240.2 MG/DL (ref 20–300)
LABORATORY COMMENT REPORT: NORMAL
METHADONE UR QL SCN: NEGATIVE NG/ML
METHAMPHET UR QL CFM: NEGATIVE
OPIATES UR QL SCN: NEGATIVE NG/ML
OXYCODONE+OXYMORPHONE UR QL SCN: NEGATIVE NG/ML
PCP UR QL: NEGATIVE NG/ML
PH UR: 5.7 [PH] (ref 4.5–8.9)
PROPOXYPH UR QL SCN: NEGATIVE NG/ML

## 2024-05-03 ENCOUNTER — TELEMEDICINE (OUTPATIENT)
Facility: CLINIC | Age: 24
End: 2024-05-03
Payer: COMMERCIAL

## 2024-05-03 DIAGNOSIS — F90.2 ATTENTION DEFICIT HYPERACTIVITY DISORDER (ADHD), COMBINED TYPE: Primary | ICD-10-CM

## 2024-05-03 PROCEDURE — 99214 OFFICE O/P EST MOD 30 MIN: CPT | Performed by: FAMILY MEDICINE

## 2024-05-03 RX ORDER — DEXTROAMPHETAMINE SACCHARATE, AMPHETAMINE ASPARTATE MONOHYDRATE, DEXTROAMPHETAMINE SULFATE AND AMPHETAMINE SULFATE 2.5; 2.5; 2.5; 2.5 MG/1; MG/1; MG/1; MG/1
10 CAPSULE, EXTENDED RELEASE ORAL DAILY
Qty: 30 CAPSULE | Refills: 0 | Status: SHIPPED | OUTPATIENT
Start: 2024-05-03 | End: 2024-06-02

## 2024-05-03 RX ORDER — DEXTROAMPHETAMINE SACCHARATE, AMPHETAMINE ASPARTATE MONOHYDRATE, DEXTROAMPHETAMINE SULFATE AND AMPHETAMINE SULFATE 2.5; 2.5; 2.5; 2.5 MG/1; MG/1; MG/1; MG/1
10 CAPSULE, EXTENDED RELEASE ORAL DAILY
Qty: 30 CAPSULE | Refills: 0 | Status: SHIPPED | OUTPATIENT
Start: 2024-06-02 | End: 2024-07-02

## 2024-05-03 RX ORDER — DEXTROAMPHETAMINE SACCHARATE, AMPHETAMINE ASPARTATE MONOHYDRATE, DEXTROAMPHETAMINE SULFATE AND AMPHETAMINE SULFATE 2.5; 2.5; 2.5; 2.5 MG/1; MG/1; MG/1; MG/1
10 CAPSULE, EXTENDED RELEASE ORAL DAILY
Qty: 30 CAPSULE | Refills: 0 | Status: SHIPPED | OUTPATIENT
Start: 2024-07-02 | End: 2024-08-01

## 2024-05-03 ASSESSMENT — PATIENT HEALTH QUESTIONNAIRE - PHQ9
SUM OF ALL RESPONSES TO PHQ QUESTIONS 1-9: 0
SUM OF ALL RESPONSES TO PHQ QUESTIONS 1-9: 0
2. FEELING DOWN, DEPRESSED OR HOPELESS: NOT AT ALL
SUM OF ALL RESPONSES TO PHQ QUESTIONS 1-9: 0
SUM OF ALL RESPONSES TO PHQ QUESTIONS 1-9: 0
1. LITTLE INTEREST OR PLEASURE IN DOING THINGS: NOT AT ALL
SUM OF ALL RESPONSES TO PHQ9 QUESTIONS 1 & 2: 0

## 2024-05-03 NOTE — PROGRESS NOTES
5/3/2024    TELEHEALTH EVALUATION -- Audio/Visual    HPI:    Tamara Fatima (:  2000) has requested an audio/video evaluation for the following concern(s):  History of Present Illness  The patient is a 23-year-old female who is seen today for follow-up on ADHD.    The patient presents for a 3-month routine follow-up. She reports an improvement in her condition with the administration of Adderall 10 mg extended-release tablet, which effectively manages her attention and focus throughout the day. She denies experiencing any adverse effects such as insomnia or palpitations. She expresses a desire to maintain the current dosage of 10 mg daily.        Review of Systems   All other systems reviewed and are negative.      Prior to Visit Medications    Medication Sig Taking? Authorizing Provider   amphetamine-dextroamphetamine (ADDERALL XR) 10 MG extended release capsule Take 1 capsule by mouth daily for 30 days. Max Daily Amount: 10 mg Yes Ernesto Braun MD   amphetamine-dextroamphetamine (ADDERALL XR) 10 MG extended release capsule Take 1 capsule by mouth daily for 30 days. Max Daily Amount: 10 mg Yes Ernesto Braun MD   amphetamine-dextroamphetamine (ADDERALL XR) 10 MG extended release capsule Take 1 capsule by mouth daily for 30 days. Max Daily Amount: 10 mg Yes Ernesto Braun MD   amphetamine-dextroamphetamine (ADDERALL XR) 10 MG extended release capsule Take 1 capsule by mouth daily for 30 days. Max Daily Amount: 10 mg  Ernesto Braun MD   amphetamine-dextroamphetamine (ADDERALL XR) 10 MG extended release capsule Take 1 capsule by mouth daily for 30 days. Max Daily Amount: 10 mg  Ernesto Braun MD   amphetamine-dextroamphetamine (ADDERALL XR) 10 MG extended release capsule Take 1 capsule by mouth daily for 30 days. Max Daily Amount: 10 mg  Ernesto Braun MD   amphetamine-dextroamphetamine (ADDERALL XR) 10 MG extended release capsule Take 1 capsule by mouth every morning for 30 days. Max Daily Amount: 10 mg

## 2024-05-03 NOTE — PATIENT INSTRUCTIONS
may need a single dorm room.     Work on relationships. Social skills training can help adults with ADHD relate to family, friends, and coworkers. Couples counseling or family therapy can also help improve relationships.   Counseling  Counseling is not meant to treat inattention, hyperactivity, or impulsiveness. But it can help with some of the problems that go along with ADHD. These include not getting along well with others and having problems following rules.  Where can you learn more?  Go to https://[email or web address].net/patientEd and enter Z848 to learn more about \"Learning About Attention Deficit Hyperactivity Disorder (ADHD) in Adults.\"  Current as of: June 24, 2023               Content Version: 14.0  © 1335-3002 [employer].   Care instructions adapted under license by [employer]. If you have questions about a medical condition or this instruction, always ask your healthcare professional. [employer] disclaims any warranty or liability for your use of this information.

## 2024-07-11 ENCOUNTER — OFFICE VISIT (OUTPATIENT)
Facility: CLINIC | Age: 24
End: 2024-07-11
Payer: MEDICAID

## 2024-07-11 VITALS
DIASTOLIC BLOOD PRESSURE: 78 MMHG | HEART RATE: 88 BPM | TEMPERATURE: 98.3 F | OXYGEN SATURATION: 98 % | RESPIRATION RATE: 17 BRPM | WEIGHT: 159 LBS | BODY MASS INDEX: 31.22 KG/M2 | HEIGHT: 60 IN | SYSTOLIC BLOOD PRESSURE: 118 MMHG

## 2024-07-11 DIAGNOSIS — N92.6 MISSED PERIOD: Primary | ICD-10-CM

## 2024-07-11 PROCEDURE — 99214 OFFICE O/P EST MOD 30 MIN: CPT | Performed by: FAMILY MEDICINE

## 2024-07-11 SDOH — ECONOMIC STABILITY: FOOD INSECURITY: WITHIN THE PAST 12 MONTHS, THE FOOD YOU BOUGHT JUST DIDN'T LAST AND YOU DIDN'T HAVE MONEY TO GET MORE.: NEVER TRUE

## 2024-07-11 SDOH — ECONOMIC STABILITY: HOUSING INSECURITY
IN THE LAST 12 MONTHS, WAS THERE A TIME WHEN YOU DID NOT HAVE A STEADY PLACE TO SLEEP OR SLEPT IN A SHELTER (INCLUDING NOW)?: NO

## 2024-07-11 SDOH — ECONOMIC STABILITY: INCOME INSECURITY: HOW HARD IS IT FOR YOU TO PAY FOR THE VERY BASICS LIKE FOOD, HOUSING, MEDICAL CARE, AND HEATING?: NOT HARD AT ALL

## 2024-07-11 SDOH — ECONOMIC STABILITY: FOOD INSECURITY: WITHIN THE PAST 12 MONTHS, YOU WORRIED THAT YOUR FOOD WOULD RUN OUT BEFORE YOU GOT MONEY TO BUY MORE.: NEVER TRUE

## 2024-07-11 NOTE — PROGRESS NOTES
Chief Complaint   Patient presents with    Menstrual Problem     Per Pt she has not had her period since April.      she is a 23 y.o. year old female who presents for evaluation of missed period since April. Pt states that she has take 4 OTC pregnancy test and they have been neg. Pt also states that as far as she knows she has not had any Thyroid issues.    Reviewed and agree with Nurse Note and duplicated in this note.  Reviewed PmHx, RxHx, FmHx, SocHx, AllgHx and updated and dated in the chart.    Family History   Problem Relation Age of Onset    Diabetes Father     High Blood Pressure Father     High Cholesterol Father        Past Medical History:   Diagnosis Date    ADHD (attention deficit hyperactivity disorder) 2006    Endocrine disease     early puberty      Social History     Socioeconomic History    Marital status: Single   Tobacco Use    Smoking status: Never    Smokeless tobacco: Never   Substance and Sexual Activity    Alcohol use: Never    Drug use: Never     Social Determinants of Health     Financial Resource Strain: Low Risk  (4/19/2023)    Overall Financial Resource Strain (CARDIA)     Difficulty of Paying Living Expenses: Not very hard   Transportation Needs: No Transportation Needs (4/19/2023)    PRAPARE - Transportation     Lack of Transportation (Medical): No     Lack of Transportation (Non-Medical): No   Housing Stability: Unknown (4/19/2023)    Housing Stability Vital Sign     Unable to Pay for Housing in the Last Year: No     Unstable Housing in the Last Year: No        Review of Systems - negative except as listed above      Objective:     Vitals:    07/11/24 1350   BP: 118/78   Pulse: 88   Resp: 17   Temp: 98.3 °F (36.8 °C)   SpO2: 98%   Weight: 72.1 kg (159 lb)   Height: 1.524 m (5')       Physical Examination: General appearance - alert, well appearing, and in no distress  Eyes - pupils equal and reactive, extraocular eye movements intact  Ears - bilateral TM's and external ear canals

## 2024-07-12 LAB — TSH SERPL DL<=0.05 MIU/L-ACNC: 1.18 UIU/ML (ref 0.36–3.74)
